# Patient Record
Sex: MALE | Race: WHITE
[De-identification: names, ages, dates, MRNs, and addresses within clinical notes are randomized per-mention and may not be internally consistent; named-entity substitution may affect disease eponyms.]

---

## 2017-05-15 ENCOUNTER — HOSPITAL ENCOUNTER (EMERGENCY)
Dept: HOSPITAL 74 - JERFT | Age: 32
Discharge: HOME | End: 2017-05-15
Payer: COMMERCIAL

## 2017-05-15 VITALS — SYSTOLIC BLOOD PRESSURE: 115 MMHG | DIASTOLIC BLOOD PRESSURE: 65 MMHG | HEART RATE: 79 BPM | TEMPERATURE: 97.9 F

## 2017-05-15 VITALS — BODY MASS INDEX: 25.8 KG/M2

## 2017-05-15 DIAGNOSIS — J30.9: Primary | ICD-10-CM

## 2017-05-15 NOTE — PDOC
History of Present Illness





- General


Chief Complaint: Cold Symptoms


Stated Complaint: COUGH/COUGHING BLOOD


Time Seen by Provider: 05/15/17 21:55


History Source: Patient, Parent(s)


Exam Limitations: No Limitations





- History of Present Illness


Initial Comments: 


05/15/17 22:45





came for evaluation of wheezing and cough=- thinks is related to allergies . 

Denies fever, denies phlegm or nasal drainage but states cough has become more 

moist and feels tighter. His takenZyrtec and nasal steroids per prescription, 

but without relief.


05/15/17 22:59





Timing/Duration: reports: changing over time, getting worse


Severity: reports: moderate


Modifying Factors: improves with: albuterol inhaler, coughing


Associated Symptoms: reports: cough, earache.  denies: chest pain/soreness, 

dizziness, fever/chills





Past History





- Travel


Traveled outside of the country in the last 30 days: Yes


Close contact w/someone who was outside of country & ill: Yes





- Past Medical History


Allergies/Adverse Reactions: 


 Allergies











Allergy/AdvReac Type Severity Reaction Status Date / Time


 


No Known Allergies Allergy   Verified 05/15/17 20:59











Home Medications: 


Ambulatory Orders





Albuterol Sulfate [Proventil HFA Inhaler -] 1 - 2 inh PO QID #1 inhaler 05/15/

17 


Prednisone [Deltasone -] 20 mg PO BID #8 tablet 05/15/17 








Other medical history: polycythemia vera





- Surgical History


Abdominal Surgery: Yes (INGUINAL AND UMBILICAL HERNIA REPAIR)





- Psycho/Social/Smoking Cessation Hx


Suicidal Ideation: No


Smoking History: Never smoked


Have you smoked in the past 12 months: No


Number of Cigarettes Smoked Daily: 0


Information on smoking cessation initiated: No


Hx Alcohol Use: Yes (WEEKENDS)


Drug/Substance Use Hx: No


Substance Use Type: Alcohol





**Review of Systems





- Review of Systems


Able to Perform ROS?: Yes


Is the patient limited English proficient: Yes


Constitutional: Yes: Symptoms Reported, See HPI, Malaise.  No: Fever


HEENTM: Yes: See HPI, Nose Congestion.  No: Symptoms Reported


Respiratory: Yes: Symptoms reported, See HPI, Cough, Wheezing


ABD/GI: Yes: Symptoms Reported, See HPI


Integumentary: Yes: Symptoms Reported


Neurological: Yes: Symptoms reported, See HPI.  No: Headache


All Other Systems: Reviewed and Negative





*Physical Exam





- Vital Signs


 Last Vital Signs











Temp Pulse Resp BP Pulse Ox


 


 97.9 F   79   18   115/65   97 


 


 05/15/17 20:59  05/15/17 20:59  05/15/17 20:59  05/15/17 20:59  05/15/17 20:59














- Physical Exam


General Appearance: Yes: Nourished, Appropriately Dressed, Apparent Distress, 

Mild Distress


HEENT: positive: CHRISTIAN, Normal ENT Inspection, TMs Normal, Pharynx Normal


Neck: positive: Supple.  negative: Tender, Lymphadenopathy (R), Lymphadenopathy 

(L)


Respiratory/Chest: positive: Lungs Clear (but tight ), Wheezing (tight 

inspiratory ).  negative: Normal Breath Sounds, Respiratory Distress


Cardiovascular: positive: Regular Rate


Gastrointestinal/Abdominal: positive: Normal Bowel Sounds, Soft.  negative: 

Tender


Extremity: positive: Normal Capillary Refill, Normal Inspection


Integumentary: positive: Normal Color, Dry, Pale


Neurologic: positive: CNs II-XII NML intact, Fully Oriented, Alert, Normal Mood/

Affect, Normal Response, Motor Strength 5/5





Progress Note





- Progress Note


Progress Note: 


ALLERGIC rhinitis, will treat with steroids, Proventil and discharge





*DC/Admit/Observation/Transfer


Diagnosis at time of Disposition: 


Allergic rhinitis


Qualifiers:


 Allergic rhinitis trigger: unspecified Allergic rhinitis seasonality: 

unspecified seasonality Qualified Code(s): J30.9 - Allergic rhinitis, 

unspecified





- Discharge Dispostion


Disposition: HOME


Condition at time of disposition: Stable


Admit: No





- Patient Instructions


Printed Discharge Instructions:  DI for Allergic Rhinitis


Additional Instructions: 


Rest, drink lots of fluids: Teas, water, soups


Saltwater gargles. Consider humidifier in room at night


Steamy showers/seem to face break up mucus


Avoid contact with allergens, exposure to pollens, close windows on a windy day


Lots of handwashing and good hygiene





Continue over-the-counter medications for symptomatic relief- may use allergic 

eyedrops for itching I


Continue antihistamines daily until pollen season is over; Zyrtec, Claritin, 

Allegra during the daytime and Benadryl at nighttime as will make sleepy


Tylenol or Motrin for fever and pain





Followup with private physician in one to 2 days as needed


Consider following up with an allergist/dermatologist for skin testing and 

possible allergy shots


Return to emergency department for worsened symptoms, fevers, dehydration





- Post Discharge Activity


Work/School Note:  Back to Work

## 2017-07-22 ENCOUNTER — HOSPITAL ENCOUNTER (EMERGENCY)
Dept: HOSPITAL 74 - JER | Age: 32
Discharge: HOME | End: 2017-07-22
Payer: COMMERCIAL

## 2017-07-22 VITALS — HEART RATE: 84 BPM | DIASTOLIC BLOOD PRESSURE: 86 MMHG | TEMPERATURE: 97.5 F | SYSTOLIC BLOOD PRESSURE: 131 MMHG

## 2017-07-22 VITALS — BODY MASS INDEX: 28.1 KG/M2

## 2017-07-22 DIAGNOSIS — R00.2: Primary | ICD-10-CM

## 2017-07-22 LAB
ALBUMIN SERPL-MCNC: 3.8 G/DL (ref 3.4–5)
ALP SERPL-CCNC: 66 U/L (ref 45–117)
ALT SERPL-CCNC: 32 U/L (ref 12–78)
ANION GAP SERPL CALC-SCNC: 6 MMOL/L (ref 8–16)
AST SERPL-CCNC: 18 U/L (ref 15–37)
BASOPHILS # BLD: 1 % (ref 0–2)
BILIRUB SERPL-MCNC: 0.3 MG/DL (ref 0.2–1)
CALCIUM SERPL-MCNC: 8.9 MG/DL (ref 8.5–10.1)
CO2 SERPL-SCNC: 30 MMOL/L (ref 21–32)
CREAT SERPL-MCNC: 0.9 MG/DL (ref 0.7–1.3)
DEPRECATED RDW RBC AUTO: 17.1 % (ref 11.9–15.9)
EOSINOPHIL # BLD: 3.6 % (ref 0–4.5)
GLUCOSE SERPL-MCNC: 87 MG/DL (ref 74–106)
MCH RBC QN AUTO: 25.3 PG (ref 25.7–33.7)
MCHC RBC AUTO-ENTMCNC: 32.3 G/DL (ref 32–35.9)
MCV RBC: 78.1 FL (ref 80–96)
NEUTROPHILS # BLD: 35.5 % (ref 42.8–82.8)
PLATELET # BLD AUTO: 227 K/MM3 (ref 134–434)
PMV BLD: 9.1 FL (ref 7.5–11.1)
PROT SERPL-MCNC: 7.3 G/DL (ref 6.4–8.2)
TROPONIN I SERPL-MCNC: < 0.02 NG/ML (ref 0–0.05)
URINE MARIJUANA THC: NEGATIVE NG/ML
WBC # BLD AUTO: 8 K/MM3 (ref 4–10)

## 2017-07-22 NOTE — PDOC
History of Present Illness





- General


Chief Complaint: Palpitations


Stated Complaint: PALPITATIONS


Time Seen by Provider: 07/22/17 03:02


History Source: Patient


Exam Limitations: No Limitations





- History of Present Illness


Initial Comments: 





07/22/17 04:05


32-year-old male with no medical history presents to the emergency department 

complaining of chest palpitations. Patient states he was resting yesterday, 

watching television when he felt an acute onset of chest palpitations lasting 

for approximately 2 minutes. This evening at approximately 2300 hrs., patient 

felt a second episode of chest palpitations lasting approximately 30 seconds 

while at rest. Patient denies headache, dizziness, lightheadedness, visual 

disturbance, neck pain, back pains, shortness of breath, abdominal pains, 

extremity numbness or tingling sensation. Patient denies any cardiac history. 

Patient states he's been a bit stress over the past 2 weeks 


Presenting Symptoms: Chest Pain


Timing/Duration: reports: intermittent


Severity/Quality: reports: dull





Past History





- Travel


Traveled outside of the country in the last 30 days: No


Close contact w/someone who was outside of country & ill: No





- Past Medical History


Allergies/Adverse Reactions: 


 Allergies











Allergy/AdvReac Type Severity Reaction Status Date / Time


 


No Known Allergies Allergy   Verified 07/22/17 02:20











Home Medications: 


Ambulatory Orders





Albuterol Sulfate [Proventil HFA Inhaler -] 1 - 2 inh PO QID #1 inhaler 05/16/ 17 


Prednisone [Deltasone -] 20 mg PO BID #8 tablet 05/16/17 











- Surgical History


Abdominal Surgery: Yes (INGUINAL AND UMBILICAL HERNIA REPAIR)





- Psycho/Social/Smoking Cessation Hx


Suicidal Ideation: No


Smoking History: Never smoked


Have you smoked in the past 12 months: No


Number of Cigarettes Smoked Daily: 0


Information on smoking cessation initiated: No


Hx Alcohol Use: No


Drug/Substance Use Hx: No


Substance Use Type: Alcohol





**Review of Systems





- Review of Systems


Able to Perform ROS?: Yes


Comments:: 


07/22/17 04:10


CONSTITUTIONAL: 


Absent: fever, chills, diaphoresis, generalized weakness, malaise, loss of 

appetite


HEENT: 


Absent: rhinorrhea, nasal congestion, throat pain, throat swelling, difficulty 

swallowing, mouth swelling, ear pain, eye pain, visual Changes


CARDIOVASCULAR: 


+chest palpitations


Absent: chest pain, loss of consciousness, palpitations, irregular heart rate, 

peripheral edema


RESPIRATORY: 


Absent: cough, shortness of breath, dyspnea with exertion, orthopnea, wheezing, 

stridor, hemoptysis


GASTROINTESTINAL:


Absent: abdominal pain, abdominal distension, nausea, vomiting, diarrhea, 

constipation, melena, hematochezia


GENITOURINARY: 


Absent: dysuria, frequency, urgency, hesitancy, hematuria, flank pain, genital 

pain


MUSCULOSKELETAL: 


Absent: myalgia, arthralgia, joint swelling


SKIN: 


Absent: rash, itching, pallor


HEMATOLOGIC/IMMUNOLOGIC: 


Absent: easy bleeding, easy bruising, lymphadenopathy, frequent infections


ENDOCRINE:


Absent: unexplained weight gain, unexplained weight loss, heat intolerance, 

cold intolerance


NEUROLOGIC: 


Absent: headache, focal weakness or paresthesias, dizziness, unsteady gait, 

seizure, mental status changes, bladder or bowel incontinence


PSYCHIATRIC: 


Absent: anxiety, depression, suicidal or homicidal ideation, hallucinations.











Is the patient limited English proficient: No





*Physical Exam





- Vital Signs


 Last Vital Signs











Temp Pulse Resp BP Pulse Ox


 


 97.5 F L  84   20   131/86   98 


 


 07/22/17 02:20  07/22/17 02:20  07/22/17 02:20  07/22/17 02:20  07/22/17 02:20














- Physical Exam


Comments: 





07/22/17 04:10


GENERAL:


Well developed, well nourished. Awake and alert. No acute distress.


HEENT:


Normocephalic, atraumatic. PERRLA, EOMI. No conjunctival pallor. Sclera are non-

icteric. Moist mucous membranes. Oropharynx is clear.


NECK: 


Supple. Full ROM. No JVD. Carotid pulses 2+ and symmetric, without bruits. No 

thyromegaly. No lymphadenopathy.


CARDIOVASCULAR:


Regular rate and rhythm. No murmurs, rubs, or gallops. Distal pulses are 2+ and 

symmetric. 


PULMONARY: 


No evidence of respiratory distress. Lungs clear to auscultation bilaterally. 

No wheezing, rales or rhonchi.


ABDOMINAL:


Soft. Non-tender. Non-distended. No rebound or guarding. No organomegaly. 

Normoactive bowel sounds. 


MUSCULOSKELETAL 


Normal range of motion at all joints. No bony deformities or tenderness. No CVA 

tenderness.


EXTREMITIES: 


No cyanosis. No clubbing. No edema. No calf tenderness.


SKIN: 


Warm and dry. Normal capillary refill. No rashes. No jaundice. 


NEUROLOGICAL: 


Alert, awake, appropriate. Cranial nerves 2-12 intact. No deficits to light 

touch and temperature in face, upper extremities and lower extremities. No 

motor deficits in the in face, upper extremities and lower extremities. 

Normoreflexic in the upper and lower extremities. Normal speech. Toes are down-

going bilaterally. Gait is normal without ataxia.


PSYCHIATRIC: 


Cooperative. Good eye contact. Appropriate mood and affect.





**Heart Score/ECG Review





- History


History: Slightly suspicious





- Electrocardiogram


EKG: Normal





- Age


Age: >/= 65





- Risk Factors


Based on the list above the patient has:: No risk factors known





- Troponin


Troponin: </= normal limit





- Score


Heart Score - Total: 2





ED Treatment Course





- LABORATORY


CBC & Chemistry Diagram: 


 07/22/17 03:08





 07/22/17 03:08





- ADDITIONAL ORDERS


Additional order review: 


 Laboratory  Results











  07/22/17 07/22/17





  03:08 03:08


 


Sodium   141


 


Potassium   4.3


 


Chloride   105


 


Carbon Dioxide   30


 


Anion Gap   6 L


 


BUN   13


 


Creatinine   0.9


 


Creat Clearance w eGFR   > 60


 


Random Glucose   87


 


Calcium   8.9


 


Total Bilirubin   0.3  D


 


AST   18


 


ALT   32  D


 


Alkaline Phosphatase   66


 


Creatine Kinase   126


 


Troponin I   < 0.02


 


Total Protein   7.3


 


Albumin   3.8


 


Opiates Screen  Negative 


 


Methadone Screen  Negative 


 


Barbiturate Screen  Negative 


 


Phencyclidine Screen  Negative 


 


Ur Amphetamines Screen  Negative 


 


MDMA (Ecstasy) Screen  Negative 


 


Benzodiazepines Screen  Negative 


 


Cocaine Screen  Negative 


 


U Marijuana (THC) Screen  Negative 








 











  07/22/17





  03:08


 


RBC  6.21 H


 


MCV  78.1 L


 


MCHC  32.3


 


RDW  17.1 H


 


MPV  9.1


 


Neutrophils %  35.5 L


 


Lymphocytes %  52.4 H


 


Monocytes %  7.5


 


Eosinophils %  3.6


 


Basophils %  1.0














- RADIOLOGY


Radiograph Interpretation: 





07/22/17 04:53


CXR 2v NAD





*DC/Admit/Observation/Transfer


Diagnosis at time of Disposition: 


 Heart palpitations





- Discharge Dispostion


Condition at time of disposition: Stable


Admit: No





- Referrals


Referrals: 


Hardeep Dave MD [Staff Physician] - 





- Patient Instructions


Printed Discharge Instructions:  DI for Palpitations


Additional Instructions: 


Follow up with the cardiologist this week.


You will need an echocardiogram





Return to the ER for severe/persistent/worsening symptoms

## 2017-07-24 NOTE — EKG
Test Reason : 

Blood Pressure : ***/*** mmHG

Vent. Rate : 076 BPM     Atrial Rate : 076 BPM

   P-R Int : 156 ms          QRS Dur : 082 ms

    QT Int : 354 ms       P-R-T Axes : 055 060 024 degrees

   QTc Int : 398 ms

 

NORMAL SINUS RHYTHM

NORMAL ECG

WHEN COMPARED WITH ECG OF 19-DEC-2016 16:59,

NO SIGNIFICANT CHANGE WAS FOUND

Confirmed by PATRICK HANKS MD ([id number]) on 7/24/2017 9:28:53 AM

 

Referred By:             Confirmed By:PATRICK HANKS MD

## 2017-09-27 ENCOUNTER — HOSPITAL ENCOUNTER (EMERGENCY)
Dept: HOSPITAL 74 - JER | Age: 32
LOS: 1 days | Discharge: HOME | End: 2017-09-28
Payer: COMMERCIAL

## 2017-09-27 VITALS — SYSTOLIC BLOOD PRESSURE: 136 MMHG | TEMPERATURE: 98.4 F | HEART RATE: 93 BPM | DIASTOLIC BLOOD PRESSURE: 84 MMHG

## 2017-09-27 VITALS — BODY MASS INDEX: 26.5 KG/M2

## 2017-09-27 DIAGNOSIS — R00.2: Primary | ICD-10-CM

## 2017-09-28 NOTE — PDOC
History of Present Illness





- General


History Source: Patient





<JaviermagdyAbelardo - Last Filed: 09/28/17 00:42>





- General


History Source: Patient


Exam Limitations: No Limitations





<Alma Gill - Last Filed: 09/28/17 00:51>





- General


Chief Complaint: Palpitations


Stated Complaint: PALPITATIONS


Time Seen by Provider: 09/28/17 00:39





- History of Present Illness


Initial Comments: 


09/28/17 00:50


31 yo M with no pmhx who presents to the ED with complaint of palpitations. 

Patient was seen here in July for the same complaint, had lab work done, EKG 

was good and he was referred to Dr. Dave of cardiology for follow up. He 

notes that his palpitations continue. 


He denies any cp, SOB, nausea, vomiting, diarrhea, fever, chills. He denies any 

urinary complaints. 


He denies any recent travel outside of the country


SH - no alcohol use, cigarette use or IVDU. (Alma Gill)








Past History





- Past Medical History


Anemia: No


Asthma: No


Cancer: No


Cardiac Disorders: No


CVA: No


COPD: No


DVT: No


Dementia: No


Diabetes: No


Dialysis: No


GI Disorders: No


 Disorders: No


HTN: No


Hypercholesterolemia: No


Kidney Stones: No


Liver Disease: No


Psychiatric Problems: No


Seizures: No


Thyroid Disease: Yes (Sleep Problems)


Lung CA: No





- Surgical History


Abdominal Surgery: Yes (INGUINAL AND UMBILICAL HERNIA REPAIR)





- Suicide/Smoking/Psychosocial Hx


Smoking History: Never smoked


Have you smoked in the past 12 months: No


Number of Cigarettes Smoked Daily: 0


Hx Alcohol Use: No


Drug/Substance Use Hx: No


Substance Use Type: Alcohol





<Abelardo Benz - Last Filed: 09/28/17 00:42>





<Alma Gill - Last Filed: 09/28/17 00:51>





- Past Medical History


Allergies/Adverse Reactions: 


 Allergies











Allergy/AdvReac Type Severity Reaction Status Date / Time


 


No Known Allergies Allergy   Verified 09/27/17 23:35











Home Medications: 


Ambulatory Orders





Diphenhydramine HCl [Benadryl -] 25 mg PO HS 07/22/17 











**Review of Systems





<Abelardo Benz - Last Filed: 09/28/17 00:42>





- Review of Systems


Able to Perform ROS?: Yes





<Alma Gill - Last Filed: 09/28/17 00:51>





- Review of Systems


Comments:: 


09/28/17 00:50


CONSTITUTIONAL: 


Absent: fever, chills, diaphoresis, generalized weakness, malaise, loss of 

appetite


HEENT: 


Absent: rhinorrhea, nasal congestion, throat pain, throat swelling, difficulty 

swallowing, mouth swelling, ear pain, eye pain, visual Changes


CARDIOVASCULAR: 


Present: palpitations


Absent: chest pain, syncope, irregular heart rate, lightheadedness, peripheral 

edema


RESPIRATORY: 


Absent: cough, shortness of breath, dyspnea with exertion, orthopnea, wheezing, 

stridor, hemoptysis


GASTROINTESTINAL:


Absent: abdominal pain, abdominal distension, nausea, vomiting, diarrhea, 

constipation, melena, hematochezia


GENITOURINARY: 


Absent: dysuria, frequency, urgency, hesitancy, hematuria, flank pain, genital 

pain


MUSCULOSKELETAL: 


Absent: myalgia, arthralgia, joint swelling


SKIN: 


Absent: rash, itching, pallor


HEMATOLOGIC/IMMUNOLOGIC: 


Absent: easy bleeding, easy bruising, lymphadenopathy, frequent infections


ENDOCRINE:


Absent: unexplained weight gain, unexplained weight loss, heat intolerance, 

cold intolerance


NEUROLOGIC: 


Absent: headache, focal weakness or paresthesias, dizziness, unsteady gait, 

seizure, mental status changes, bladder or bowel incontinence


PSYCHIATRIC: 


Absent: anxiety, depression, suicidal or homicidal ideation, hallucinations.


 (Alma Gill)








*Physical Exam





<Abelardo Benz - Last Filed: 09/28/17 00:42>





<Alma Gill - Last Filed: 09/28/17 00:51>





- Vital Signs





 Last Vital Signs











Temp Pulse Resp BP Pulse Ox


 


 98.4 F   93 H  18   136/84   99 


 


 09/27/17 23:33  09/27/17 23:33  09/27/17 23:33  09/27/17 23:33  09/27/17 23:33

















- Physical Exam


Comments: 


09/28/17 00:50


GENERAL:


Well developed, well nourished. Awake and alert. In no acute distress.


HEENT:


Normocephalic, atraumatic. PERRLA, EOMI. No conjunctival pallor. Sclerae are non

-icteric. Moist mucous membranes. Oropharynx is clear.


NECK: Supple. Full ROM. No JVD. Carotid pulses 2+ and symmetric, without 

bruits. No thyromegaly. No lymphadenopathy.


CARDIOVASCULAR:


Regular rate and rhythm. No murmurs, rubs, or gallops. Distal pulses are 2+ and 

symmetric. 


PULMONARY: 


No evidence of respiratory distress. Lungs clear to auscultation bilaterally. 

No wheezing, rales or rhonchi.


ABDOMINAL:


Soft. Non-tender. Non-distended. No rebound or guarding. No organomegaly. 

Normoactive bowel sounds. 


MUSCULOSKELETAL 


Normal range of motion at all joints. No bony deformities or tenderness. No CVA 

tenderness.


EXTREMITIES: 


No cyanosis. No clubbing. No edema. No calf tenderness.


SKIN: Warm and dry. Normal capillary refill. No rashes. No jaundice. 


NEUROLOGICAL: Alert, awake, appropriate. Cranial nerves 2-12 intact. No 

deficits to light touch and temperature in face, upper extremities and lower 

extremities. No motor deficits in the in face, upper extremities and lower 

extremities. Normoreflexic in the upper and lower extremities. Normal speech. 

Toes are downgoing bilaterally. 


PSYCHIATRIC: 


Cooperative. Good eye contact. Appropriate mood and affect.


 (Alma Gill)








**Heart Score/ECG Review





<Alma Gill - Last Filed: 09/28/17 00:51>


  ** #1





09/28/17 00:51


NS at 89 bpm 


Normal ECG (Alma Gill)








*DC/Admit/Observation/Transfer





- Discharge Dispostion


Admit: No





<Abelardo Benz - Last Filed: 09/28/17 00:42>





<Alma Gill - Last Filed: 09/28/17 00:51>


Diagnosis at time of Disposition: 


 Heart palpitations





- Referrals


Referrals: 


Hardeep Dave MD [Staff Physician] - 





- Patient Instructions


Printed Discharge Instructions:  DI for Palpitations


Additional Instructions: 


Please make appointment earlier to follow up with cardiology.  





- Attestations


Scribe Attestion: 





09/28/17 00:51





Documentation prepared by BRICE Cuevas, acting as medical scribe for 

Abelardo Benz DO. (Alma Gill)

## 2017-10-05 NOTE — EKG
Test Reason : 

Blood Pressure : ***/*** mmHG

Vent. Rate : 089 BPM     Atrial Rate : 089 BPM

   P-R Int : 154 ms          QRS Dur : 086 ms

    QT Int : 352 ms       P-R-T Axes : 051 061 028 degrees

   QTc Int : 428 ms

 

NORMAL SINUS RHYTHM

NORMAL ECG

WHEN COMPARED WITH ECG OF 22-JUL-2017 02:03,

NO SIGNIFICANT CHANGE WAS FOUND

Confirmed by PATRICK HANKS MD ([id number]) on 10/5/2017 2:17:55 PM

 

Referred By:             Confirmed By:PATRICK HANKS MD

## 2017-10-08 ENCOUNTER — HOSPITAL ENCOUNTER (EMERGENCY)
Dept: HOSPITAL 74 - JERFT | Age: 32
Discharge: HOME | End: 2017-10-08
Payer: COMMERCIAL

## 2017-10-08 VITALS — TEMPERATURE: 98.6 F | HEART RATE: 87 BPM | DIASTOLIC BLOOD PRESSURE: 83 MMHG | SYSTOLIC BLOOD PRESSURE: 131 MMHG

## 2017-10-08 VITALS — BODY MASS INDEX: 25.8 KG/M2

## 2017-10-08 DIAGNOSIS — R09.81: Primary | ICD-10-CM
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
## 2018-05-19 ENCOUNTER — HOSPITAL ENCOUNTER (EMERGENCY)
Dept: HOSPITAL 74 - JER | Age: 33
Discharge: HOME | End: 2018-05-19
Payer: COMMERCIAL

## 2018-05-19 VITALS — BODY MASS INDEX: 27.1 KG/M2

## 2018-05-19 VITALS — SYSTOLIC BLOOD PRESSURE: 127 MMHG | TEMPERATURE: 98.2 F | DIASTOLIC BLOOD PRESSURE: 87 MMHG | HEART RATE: 91 BPM

## 2018-05-19 DIAGNOSIS — J18.9: Primary | ICD-10-CM

## 2018-05-19 NOTE — PDOC
History of Present Illness





- General


History Source: Patient


Exam Limitations: No Limitations





- History of Present Illness


Initial Comments: 





05/19/18 06:35


Patient is a 33 year old male with no significant past medical history who 

presents to the ED with complaints of coughing that he states began x3 weeks 

ago.  Patient reports experiencing cough sudden cough that he states began 3 

weeks ago, and has shown no signs of subsiding.  He reports experiencing 

associated right flank pain that he states is increased secondary to cough. 

Patient reports going to urgent care earlier this week for cough, stating he 

was prescribed cough medicine and discharged. 





Denies chest pain, sob. Denies nausea vomiting. Denies fevers, chills. Denies 

contact with sick individuals, out of state travelling. Denies any other 

symptoms. 





Allergies: None


Social history: Lived with girlfriend. No smoking. No alcohol. No illicit 

drugs. 


Surgical history: None


PMD: None








<Vijay Ramos - Last Filed: 05/19/18 06:35>





<Lupe Beltre - Last Filed: 05/20/18 05:25>





- General


Stated Complaint: COUGH, BACK PAIN


Time Seen by Provider: 05/19/18 03:00





Past History





<Vijay Ramos - Last Filed: 05/19/18 06:35>





- Past Medical History


Anemia: No


Asthma: No


Cancer: No


Cardiac Disorders: No


CVA: No


COPD: No


DVT: No


Dementia: No


Diabetes: No


Dialysis: No


GI Disorders: No


 Disorders: No


HTN: No


Hypercholesterolemia: No


Kidney Stones: No


Liver Disease: No


Psychiatric Problems: No


Seizures: No


Thyroid Disease: Yes (Sleep Problems)


Lung CA: No





- Surgical History


Abdominal Surgery: Yes (INGUINAL AND UMBILICAL HERNIA REPAIR)





- Suicide/Smoking/Psychosocial Hx


Smoking History: Never smoked


Have you smoked in the past 12 months: No


Number of Cigarettes Smoked Daily: 0


Hx Alcohol Use: No


Drug/Substance Use Hx: No


Substance Use Type: Alcohol





<Lupe Beltre - Last Filed: 05/20/18 05:25>





- Past Medical History


Allergies/Adverse Reactions: 


 Allergies











Allergy/AdvReac Type Severity Reaction Status Date / Time


 


No Known Allergies Allergy   Verified 10/08/17 21:06











Home Medications: 


Ambulatory Orders





Ibuprofen 600 mg PO TID #21 tablet 10/08/17 


Pseudoephedrine HCl [Sudafed 12 Hour] 120 mg PO BID #14 tablet.er 10/08/17 


Azithromycin [Zithromax -] 250 mg PO DAILY #4 tab 05/19/18 











**Review of Systems





- Review of Systems


Able to Perform ROS?: Yes


Comments:: 





05/19/18 06:35


GENERAL/CONSTITUTIONAL: No fever or chills. No weakness.


HEAD, EYES, EARS, NOSE AND THROAT: No change in vision. No ear pain or 

discharge. No sore throat.


CARDIOVASCULAR: No chest pain or shortness of breath.


RESPIRATORY: +Cough. 


No  wheezing, or hemoptysis.


GASTROINTESTINAL: No nausea, vomiting, diarrhea or constipation.


GENITOURINARY: No dysuria, frequency, or change in urination.


MUSCULOSKELETAL: +Right flank pain. 


No joint or muscle swelling or pain. No neck pain.


SKIN: No rash


NEUROLOGIC: No headache, vertigo, loss of consciousness, or change in strength/

sensation.


ENDOCRINE: No increased thirst. No abnormal weight change.


HEMATOLOGIC/LYMPHATIC: No anemia, easy bleeding, or history of blood clots.


ALLERGIC/IMMUNOLOGIC: No hives or skin allergy.








<Vijay Ramos - Last Filed: 05/19/18 06:35>





*Physical Exam





- Vital Signs


 Last Vital Signs











Temp Pulse Resp BP Pulse Ox


 


 98.2 F   91 H  18   127/87   95 


 


 05/19/18 03:19  05/19/18 03:19  05/19/18 03:19  05/19/18 03:19  05/19/18 03:19














- Physical Exam


Comments: 





05/19/18 06:35


GENERAL: Awake, alert, and fully oriented, in no acute distress


HEAD: No signs of trauma


EYES: PERRLA, EOMI, sclera anicteric, conjunctiva clear


ENT: Auricles normal inspection, hearing grossly normal, nares patent, 

oropharynx clear without exudates. Moist mucosa


NECK: Normal ROM, supple, no lymphadenopathy, JVD, or masses


LUNGS: Breath sounds equal, clear to auscultation bilaterally.  No wheezes, and 

no crackles


HEART: Regular rate and rhythm, normal S1 and S2, no murmurs, rubs or gallops


ABDOMEN: Soft, nontender, normoactive bowel sounds.  No guarding, no rebound.  

No masses


EXTREMITIES: Normal range of motion, no edema.  No clubbing or cyanosis. No 

cords, erythema, or tenderness


NEUROLOGICAL: Cranial nerves II through XII grossly intact.  Normal speech, 

normal gait


SKIN: Warm, Dry, normal turgor, no rashes or lesions noted.








<Vijay Ramos - Last Filed: 05/19/18 06:35>





ED Treatment Course





- Medications


Given in the ED: 


ED Medications














Discontinued Medications














Generic Name Dose Route Start Last Admin





  Trade Name Lien  PRN Reason Stop Dose Admin


 


Azithromycin  500 mg  05/19/18 03:47  05/19/18 04:31





  Zithromax -  PO  05/19/18 03:48  500 mg





  ONCE ONE   Administration














<Vijay Ramos - Last Filed: 05/19/18 06:35>





Medical Decision Making





- Medical Decision Making





05/20/18 05:24


Pt comes with 3 weeks of cough; likely atypical pneumonia; I will get a CXR to r

/o bad pathology; if normal CXR I will treat for atypical bacteria with a zpak.


Exam as above.





<Lupe Beltre - Last Filed: 05/20/18 05:25>





*DC/Admit/Observation/Transfer





- Attestations


Scribe Attestion: 





05/19/18 06:35





Documentation prepared by Vijay Ramos, acting as medical scribe for Lupe Beltre MD.





<Vijay Ramos - Last Filed: 05/19/18 06:35>





- Discharge Dispostion


Decision to Admit order: No





<Lupe Beltre - Last Filed: 05/20/18 05:25>


Diagnosis at time of Disposition: 


 Atypical pneumonia








- Discharge Dispostion


Disposition: HOME


Condition at time of disposition: Stable





- Prescriptions


Prescriptions: 


Azithromycin [Zithromax -] 250 mg PO DAILY #4 tab





- Patient Instructions


Printed Discharge Instructions:  DI for Atypical Pneumonia

## 2018-07-06 ENCOUNTER — HOSPITAL ENCOUNTER (EMERGENCY)
Dept: HOSPITAL 74 - JERFT | Age: 33
Discharge: HOME | End: 2018-07-06
Payer: COMMERCIAL

## 2018-07-06 VITALS — BODY MASS INDEX: 28.1 KG/M2

## 2018-07-06 VITALS — DIASTOLIC BLOOD PRESSURE: 67 MMHG | SYSTOLIC BLOOD PRESSURE: 118 MMHG | HEART RATE: 75 BPM | TEMPERATURE: 98 F

## 2018-07-06 DIAGNOSIS — K21.9: ICD-10-CM

## 2018-07-06 DIAGNOSIS — H57.12: Primary | ICD-10-CM

## 2018-07-06 NOTE — PDOC
History of Present Illness





- History of Present Illness


Initial Comments: 





 





<Luisa Pappas - Last Filed: 07/06/18 22:51>





- General


History Source: Patient


Exam Limitations: No Limitations





- History of Present Illness


Initial Comments: 





The patient is a 33 year old male with a significant PMH of acid reflux 

presenting with left ear and eye pain for two days. The patient states the his 

eye pain is worse with eye movement. Patient denies similar symptoms in the 

past. The patient admits to photophobia but denies any eye or ear discharge. 

The patient reports he had an accident when he was young in which he had an 

infection in his right eye and follows with an opthamologist to keep his left 

eye healthy. Patient has a scheduled appointment next week. The patient states 

he had high pressure in his left eye one year ago and was prescribed eye drops 

at the time. The patient denies any vision changes. The patient states he took 

Tylenol with no relief. 





The patient denies chest pain, shortness of breath, headache and dizziness.


Denies fever, chills, nausea, vomit, diarrhea and constipation.





Allergies: NKA


Past surgical history:  hernia surgery 


Social history: No reported alcohol, drug, or cigarette use. 


PCP: Dr. Mckenna 








<Sierra Willard - Last Filed: 07/07/18 00:37>





- General


Chief Complaint: Eye Problem


Stated Complaint: EYE PAIN


Time Seen by Provider: 07/06/18 22:48





Past History





<Luisa Pappas - Last Filed: 07/06/18 22:51>





- Past Medical History


Anemia: No


Asthma: No


Cancer: No


Cardiac Disorders: No


CVA: No


COPD: No


DVT: No


Dementia: No


Diabetes: No


Dialysis: No


GI Disorders: No


 Disorders: No


HTN: No


Hypercholesterolemia: No


Kidney Stones: No


Liver Disease: No


Psychiatric Problems: No


Seizures: No


Thyroid Disease: Yes (Sleep Problems)


Lung CA: No





- Surgical History


Abdominal Surgery: Yes (INGUINAL AND UMBILICAL HERNIA REPAIR)





- Suicide/Smoking/Psychosocial Hx


Smoking History: Never smoked


Have you smoked in the past 12 months: No


Number of Cigarettes Smoked Daily: 0


Information on smoking cessation initiated: No


Hx Alcohol Use: No


Drug/Substance Use Hx: No


Substance Use Type: Alcohol





<Sierra Willard - Last Filed: 07/07/18 00:37>





- Past Medical History


Allergies/Adverse Reactions: 


 Allergies











Allergy/AdvReac Type Severity Reaction Status Date / Time


 


No Known Allergies Allergy   Verified 07/06/18 23:00











Home Medications: 


Ambulatory Orders





Ibuprofen 600 mg PO TID #21 tablet 10/08/17 


Pseudoephedrine HCl [Sudafed 12 Hour] 120 mg PO BID #14 tablet.er 10/08/17 











**Review of Systems





- Review of Systems


Able to Perform ROS?: Yes


Comments:: 





GENERAL/CONSTITUTIONAL: No fever or chills. No weakness.


HEAD, EYES, EARS, NOSE AND THROAT: (+) Left eye pain. (+) Left ear pain. No 

change in vision. No ear discharge. No sore throat.


CARDIOVASCULAR: No chest pain or shortness of breath.


RESPIRATORY: No cough, wheezing, or hemoptysis.


GASTROINTESTINAL: No nausea, vomiting, diarrhea or constipation.


GENITOURINARY: No dysuria, frequency, or change in urination.


MUSCULOSKELETAL: No joint or muscle swelling or pain. No neck or back pain.


SKIN: No rash


NEUROLOGIC: No headache, vertigo, loss of consciousness, or change in strength/

sensation.


ENDOCRINE: No increased thirst. No abnormal weight change.


HEMATOLOGIC/LYMPHATIC: No anemia, easy bleeding, or history of blood clots.


ALLERGIC/IMMUNOLOGIC: No hives or skin allergy.








<Sierra Willard - Last Filed: 07/07/18 00:37>





*Physical Exam





- Vital Signs


 Last Vital Signs











Temp Pulse Resp BP Pulse Ox


 


 98.0 F   75   18   118/67   100 


 


 07/06/18 20:30  07/06/18 20:30  07/06/18 20:30  07/06/18 20:30  07/06/18 20:30














<Luisa Pappas - Last Filed: 07/06/18 22:51>





- Vital Signs


 Last Vital Signs











Temp Pulse Resp BP Pulse Ox


 


 98.0 F   75   18   118/67   100 


 


 07/06/18 20:30  07/06/18 20:30  07/06/18 20:30  07/06/18 20:30  07/06/18 20:30














- Physical Exam


Comments: 





GENERAL: Awake, alert, and fully oriented, in no acute distress


HEAD: No signs of trauma


EYES: PERRLA, EOMI, sclera anicteric, conjunctiva clear. Fluorescein stain of L 

eye- no abnormal uptake. 


ENT: Auricles normal inspection, hearing grossly normal, nares patent, 

oropharynx clear without exudates. Moist mucosa


NECK: Normal ROM, supple, no lymphadenopathy, JVD, or masses


EXTREMITIES: Normal range of motion, no edema.  No clubbing or cyanosis. No 

cords, erythema, or tenderness


NEUROLOGICAL: Cranial nerves II through XII grossly intact.  Normal speech, 

normal gait. Motor and sensation intact.


SKIN: Warm, Dry, normal turgor, no rashes or lesions noted.








<Sierra Willard - Last Filed: 07/07/18 00:37>





Medical Decision Making





- Medical Decision Making





No swelling around the eye, no redness, no signs of infection. No tenderness to 

palpation of the globe over the eyelid. Globe is not firm, not indurated. 

Fluorescein exam negative. Advised him to call his ophthalmologist in AM for 

follow-up. 








<Sierra Willard - Last Filed: 07/07/18 00:37>





*DC/Admit/Observation/Transfer





<Luisa Pappas - Last Filed: 07/06/18 22:51>





- Discharge Dispostion


Decision to Admit order: No





<Sierra Willard - Last Filed: 07/07/18 00:37>


Diagnosis at time of Disposition: 


 Left eye pain








- Discharge Dispostion


Disposition: HOME


Condition at time of disposition: Stable





- Patient Instructions


Printed Discharge Instructions:  DI for Eye Pain

## 2018-10-16 ENCOUNTER — HOSPITAL ENCOUNTER (EMERGENCY)
Dept: HOSPITAL 26 - MED | Age: 33
Discharge: HOME | End: 2018-10-16
Payer: MEDICAID

## 2018-10-16 VITALS — BODY MASS INDEX: 29.03 KG/M2 | WEIGHT: 196 LBS | HEIGHT: 69 IN

## 2018-10-16 VITALS — DIASTOLIC BLOOD PRESSURE: 79 MMHG | SYSTOLIC BLOOD PRESSURE: 121 MMHG

## 2018-10-16 VITALS — SYSTOLIC BLOOD PRESSURE: 121 MMHG | DIASTOLIC BLOOD PRESSURE: 79 MMHG

## 2018-10-16 DIAGNOSIS — J32.9: Primary | ICD-10-CM

## 2018-10-16 DIAGNOSIS — R42: ICD-10-CM

## 2018-10-16 LAB
ALBUMIN FLD-MCNC: 4.1 G/DL (ref 3.4–5)
ANION GAP SERPL CALCULATED.3IONS-SCNC: 9.7 MMOL/L (ref 8–16)
APPEARANCE UR: CLEAR
AST SERPL-CCNC: 18 U/L (ref 15–37)
BARBITURATES UR QL SCN: NEGATIVE NG/ML
BASOPHILS # BLD AUTO: 0.1 K/UL (ref 0–0.22)
BASOPHILS NFR BLD AUTO: 0.7 % (ref 0–2)
BENZODIAZ UR QL SCN: NEGATIVE NG/ML
BILIRUB SERPL-MCNC: 0.3 MG/DL (ref 0–1)
BILIRUB UR QL STRIP: NEGATIVE
BUN SERPL-MCNC: 16 MG/DL (ref 7–18)
BZE UR QL SCN: NEGATIVE NG/ML
CANNABINOIDS UR QL SCN: NEGATIVE NG/ML
CHLORIDE SERPL-SCNC: 103 MMOL/L (ref 98–107)
CO2 SERPL-SCNC: 29.3 MMOL/L (ref 21–32)
COLOR UR: YELLOW
CREAT SERPL-MCNC: 1 MG/DL (ref 0.7–1.3)
EOSINOPHIL # BLD AUTO: 0.2 K/UL (ref 0–0.4)
EOSINOPHIL NFR BLD AUTO: 2.5 % (ref 0–4)
ERYTHROCYTE [DISTWIDTH] IN BLOOD BY AUTOMATED COUNT: 18.3 % (ref 11.6–13.7)
GFR SERPL CREATININE-BSD FRML MDRD: 112 ML/MIN (ref 90–?)
GLUCOSE SERPL-MCNC: 97 MG/DL (ref 74–106)
GLUCOSE UR STRIP-MCNC: NEGATIVE MG/DL
HCT VFR BLD AUTO: 51.9 % (ref 36–52)
HGB BLD-MCNC: 17.1 G/DL (ref 12–18)
HGB UR QL STRIP: NEGATIVE
LEUKOCYTE ESTERASE UR QL STRIP: NEGATIVE
LYMPHOCYTES # BLD AUTO: 3.3 K/UL (ref 2–11.5)
LYMPHOCYTES NFR BLD AUTO: 45.8 % (ref 20.5–51.1)
MCH RBC QN AUTO: 25 PG (ref 27–31)
MCHC RBC AUTO-ENTMCNC: 33 G/DL (ref 33–37)
MCV RBC AUTO: 76.3 FL (ref 80–94)
MONOCYTES # BLD AUTO: 0.8 K/UL (ref 0.8–1)
MONOCYTES NFR BLD AUTO: 11.3 % (ref 1.7–9.3)
NEUTROPHILS # BLD AUTO: 2.9 K/UL (ref 1.8–7.7)
NEUTROPHILS NFR BLD AUTO: 39.7 % (ref 42.2–75.2)
NITRITE UR QL STRIP: NEGATIVE
OPIATES UR QL SCN: NEGATIVE NG/ML
PCP UR QL SCN: NEGATIVE NG/ML
PH UR STRIP: 6 [PH] (ref 5–9)
PLATELET # BLD AUTO: 226 K/UL (ref 140–450)
POTASSIUM SERPL-SCNC: 4 MMOL/L (ref 3.5–5.1)
RBC # BLD AUTO: 6.8 MIL/UL (ref 4.2–6.1)
SODIUM SERPL-SCNC: 138 MMOL/L (ref 136–145)
WBC # BLD AUTO: 7.2 K/UL (ref 4.8–10.8)

## 2018-10-27 ENCOUNTER — HOSPITAL ENCOUNTER (EMERGENCY)
Dept: HOSPITAL 26 - MED | Age: 33
Discharge: HOME | End: 2018-10-27
Payer: MEDICAID

## 2018-10-27 VITALS — SYSTOLIC BLOOD PRESSURE: 137 MMHG | DIASTOLIC BLOOD PRESSURE: 83 MMHG

## 2018-10-27 VITALS — SYSTOLIC BLOOD PRESSURE: 135 MMHG | DIASTOLIC BLOOD PRESSURE: 91 MMHG

## 2018-10-27 VITALS — WEIGHT: 202 LBS | HEIGHT: 70 IN | BODY MASS INDEX: 28.92 KG/M2

## 2018-10-27 DIAGNOSIS — D45: ICD-10-CM

## 2018-10-27 DIAGNOSIS — K29.60: Primary | ICD-10-CM

## 2018-10-27 LAB
ALBUMIN FLD-MCNC: 3.9 G/DL (ref 3.4–5)
ANION GAP SERPL CALCULATED.3IONS-SCNC: 11.9 MMOL/L (ref 8–16)
APPEARANCE UR: CLEAR
AST SERPL-CCNC: 20 U/L (ref 15–37)
BASOPHILS # BLD AUTO: 0 K/UL (ref 0–0.22)
BASOPHILS NFR BLD AUTO: 0.2 % (ref 0–2)
BILIRUB SERPL-MCNC: 0.4 MG/DL (ref 0–1)
BILIRUB UR QL STRIP: NEGATIVE
BUN SERPL-MCNC: 10 MG/DL (ref 7–18)
CHLORIDE SERPL-SCNC: 103 MMOL/L (ref 98–107)
CO2 SERPL-SCNC: 28.4 MMOL/L (ref 21–32)
COLOR UR: YELLOW
CREAT SERPL-MCNC: 1.1 MG/DL (ref 0.7–1.3)
EOSINOPHIL # BLD AUTO: 0 K/UL (ref 0–0.4)
EOSINOPHIL NFR BLD AUTO: 0.1 % (ref 0–4)
ERYTHROCYTE [DISTWIDTH] IN BLOOD BY AUTOMATED COUNT: 18.2 % (ref 11.6–13.7)
GFR SERPL CREATININE-BSD FRML MDRD: 100 ML/MIN (ref 90–?)
GLUCOSE SERPL-MCNC: 170 MG/DL (ref 74–106)
GLUCOSE UR STRIP-MCNC: NEGATIVE MG/DL
HCT VFR BLD AUTO: 50.8 % (ref 36–52)
HGB BLD-MCNC: 16.4 G/DL (ref 12–18)
HGB UR QL STRIP: NEGATIVE
LEUKOCYTE ESTERASE UR QL STRIP: NEGATIVE
LIPASE SERPL-CCNC: 148 U/L (ref 73–393)
LYMPHOCYTES # BLD AUTO: 1.5 K/UL (ref 2–11.5)
LYMPHOCYTES NFR BLD AUTO: 14.8 % (ref 20.5–51.1)
MCH RBC QN AUTO: 25 PG (ref 27–31)
MCHC RBC AUTO-ENTMCNC: 32 G/DL (ref 33–37)
MCV RBC AUTO: 76.7 FL (ref 80–94)
MONOCYTES # BLD AUTO: 0.3 K/UL (ref 0.8–1)
MONOCYTES NFR BLD AUTO: 3 % (ref 1.7–9.3)
NEUTROPHILS # BLD AUTO: 8.5 K/UL (ref 1.8–7.7)
NEUTROPHILS NFR BLD AUTO: 81.9 % (ref 42.2–75.2)
NITRITE UR QL STRIP: NEGATIVE
PH UR STRIP: 5 [PH] (ref 5–9)
PLATELET # BLD AUTO: 242 K/UL (ref 140–450)
POTASSIUM SERPL-SCNC: 4.3 MMOL/L (ref 3.5–5.1)
RBC # BLD AUTO: 6.62 MIL/UL (ref 4.2–6.1)
SODIUM SERPL-SCNC: 139 MMOL/L (ref 136–145)
WBC # BLD AUTO: 10.4 K/UL (ref 4.8–10.8)

## 2018-10-27 PROCEDURE — 83690 ASSAY OF LIPASE: CPT

## 2018-10-27 PROCEDURE — 80053 COMPREHEN METABOLIC PANEL: CPT

## 2018-10-27 PROCEDURE — 76705 ECHO EXAM OF ABDOMEN: CPT

## 2018-10-27 PROCEDURE — 96374 THER/PROPH/DIAG INJ IV PUSH: CPT

## 2018-10-27 PROCEDURE — 96361 HYDRATE IV INFUSION ADD-ON: CPT

## 2018-10-27 PROCEDURE — 36415 COLL VENOUS BLD VENIPUNCTURE: CPT

## 2018-10-27 PROCEDURE — 85025 COMPLETE CBC W/AUTO DIFF WBC: CPT

## 2018-10-27 PROCEDURE — 96375 TX/PRO/DX INJ NEW DRUG ADDON: CPT

## 2018-10-27 PROCEDURE — C9113 INJ PANTOPRAZOLE SODIUM, VIA: HCPCS

## 2018-10-27 PROCEDURE — 99285 EMERGENCY DEPT VISIT HI MDM: CPT

## 2018-10-27 PROCEDURE — 81003 URINALYSIS AUTO W/O SCOPE: CPT

## 2018-10-27 NOTE — NUR
PATIENT PRESENTS TO ED WITH THE CHIEF C/O DIZZINESS AND PAIN RIGHT SIDE ABDOMEN 
PAIN. BURNING PAIN. PT STATES PAIN STARTED 1 WEEK AGO. HAS NAUSEA AT THIS TIME. 
DENIES V/D; SKIN IS PINK/WARM/DRY; AAOX4 WITH EVEN AND STEADY GAIT; LUNGS CLEAR 
BL; HR EVEN AND REGULAR; PT DENIES ANY FEVER, CP, SOB, OR COUGH AT THIS TIME; 
PATIENT STATES PAIN OF 8/10 AT THIS TIME; VSS; PATIENT POSITIONED FOR COMFORT; 
HOB ELEVATED; BEDRAILS UP X2; BED DOWN. ER MD MADE AWARE OF PT STATUS.

## 2018-10-27 NOTE — NUR
Patient discharged with v/s stable. Written and verbal after care instructions 
given and explained. Patient alert, oriented and verbalized understanding of 
instructions. Ambulatory with steady gait. All questions addressed prior to 
discharge. ID band removed. Patient advised to follow up with PMD. Rx of 
OMEPRAZOLE AND MECLIZINE given. Patient educated on indication of medication 
including possible reaction and side effects. Opportunity to ask questions 
provided and answered.

## 2019-10-09 ENCOUNTER — HOSPITAL ENCOUNTER (EMERGENCY)
Dept: HOSPITAL 74 - JERFT | Age: 34
Discharge: HOME | End: 2019-10-09
Payer: COMMERCIAL

## 2019-10-09 VITALS — SYSTOLIC BLOOD PRESSURE: 120 MMHG | DIASTOLIC BLOOD PRESSURE: 85 MMHG | TEMPERATURE: 98.3 F | HEART RATE: 82 BPM

## 2019-10-09 VITALS — BODY MASS INDEX: 27.2 KG/M2

## 2019-10-09 DIAGNOSIS — Z98.890: ICD-10-CM

## 2019-10-09 DIAGNOSIS — J02.9: Primary | ICD-10-CM

## 2019-10-09 DIAGNOSIS — K92.9: ICD-10-CM

## 2019-10-09 NOTE — PDOC
History of Present Illness





- General


Chief Complaint: Sore Throat


Stated Complaint: THROAT PAIN AND LT EAR PAIN


Time Seen by Provider: 10/09/19 13:50


History Source: Patient


Exam Limitations: No Limitations





Past History





- Travel


Traveled outside of the country in the last 30 days: No


Close contact w/someone who was outside of country & ill: No





- Past Medical History


Allergies/Adverse Reactions: 


 Allergies











Allergy/AdvReac Type Severity Reaction Status Date / Time


 


No Known Allergies Allergy   Verified 10/09/19 13:48











Home Medications: 


Ambulatory Orders





Ibuprofen 600 mg PO TID #21 tablet 10/08/17 


Pseudoephedrine HCl [Sudafed 12 Hour] 120 mg PO BID #14 tablet.er 10/08/17 








Anemia: No


Asthma: No


Cancer: No


Cardiac Disorders: No


CVA: No


COPD: No


DVT: No


Dementia: No


Diabetes: No


Dialysis: No


GI Disorders: No


 Disorders: No


HTN: No


Hypercholesterolemia: No


Kidney Stones: No


Liver Disease: No


Psychiatric Problems: No


Seizures: No


Thyroid Disease: Yes (Sleep Problems)


Lung CA: No





- Surgical History


Abdominal Surgery: Yes (INGUINAL AND UMBILICAL HERNIA REPAIR)





- Immunization History


Immunization Up to Date: Yes





- Psycho Social/Smoking Cessation Hx


Smoking History: Never smoked


Have you smoked in the past 12 months: No


Number of Cigarettes Smoked Daily: 0


Hx Alcohol Use: No


Drug/Substance Use Hx: No


Substance Use Type: Alcohol





**Review of Systems





- Review of Systems


Able to Perform ROS?: Yes


Comments:: 





10/09/19 15:01


CONSTITUTIONAL: 


Absent: fever, chills, diaphoresis, generalized weakness, malaise, loss of 

appetite


HEENT: 


Present: sore throat Absent: rhinorrhea, nasal congestion, throat swelling, 

difficulty swallowing, mouth swelling, ear pain, eye pain, visual Changes


CARDIOVASCULAR: 


Absent: chest pain, loss of consciousness, palpitations, irregular heart rate, 

peripheral edema


RESPIRATORY: 


Absent: cough, shortness of breath, dyspnea with exertion, orthopnea, wheezing, 

stridor, hemoptysis


GASTROINTESTINAL:


Absent: abdominal pain, abdominal distension, nausea, vomiting, diarrhea, 

constipation, melena, hematochezia


GENITOURINARY: 


Absent: dysuria, frequency, urgency, hesitancy, hematuria, flank pain, genital 

pain


MUSCULOSKELETAL: 


Absent: myalgia, arthralgia, joint swelling


SKIN: 


Absent: rash, itching, pallor


HEMATOLOGIC/IMMUNOLOGIC: 


Absent: easy bleeding, easy bruising, lymphadenopathy, frequent infections


ENDOCRINE:


Absent: unexplained weight gain, unexplained weight loss, heat intolerance, 

cold intolerance


NEUROLOGIC: 


Absent: headache, focal weakness or paresthesias, dizziness, unsteady gait, 

seizure, mental status changes, bladder or bowel incontinence


PSYCHIATRIC: 


Absent: anxiety, depression, suicidal or homicidal ideation, hallucinations.


Is the patient limited English proficient: No





*Physical Exam





- Vital Signs


 Last Vital Signs











Temp Pulse Resp BP Pulse Ox


 


 98.3 F   82   16   120/85   97 


 


 10/09/19 13:48  10/09/19 13:48  10/09/19 13:48  10/09/19 13:48  10/09/19 13:48














- Physical Exam


Comments: 





10/09/19 15:01


GENERAL:


Well developed, well nourished. Awake and alert. No acute distress.


HEENT:


Normocephalic, atraumatic. PERRLA, EOMI. No conjunctival pallor. Sclera are non-

icteric. Moist mucous membranes. Oropharynx is with 3+ tonsils and erythema.  

No exudate noted.  Uvula is midline.


NECK: 


Supple. Full ROM. No JVD. Carotid pulses 2+ and symmetric, without bruits. No 

thyromegaly. No lymphadenopathy.


CARDIOVASCULAR:


Regular rate and rhythm. No murmurs, rubs, or gallops. Distal pulses are 2+ and 

symmetric. 


PULMONARY: 


No evidence of respiratory distress. Lungs clear to auscultation bilaterally. 

No wheezing, rales or rhonchi.


ABDOMINAL:


Soft. Non-tender. Non-distended. No rebound or guarding. No organomegaly. 

Normoactive bowel sounds. 


MUSCULOSKELETAL 


Normal range of motion at all joints. No bony deformities or tenderness. No CVA 

tenderness.


EXTREMITIES: 


No cyanosis. No clubbing. No edema. No calf tenderness.


SKIN: 


Warm and dry. Normal capillary refill. No rashes. No jaundice. 


NEUROLOGICAL: 


Alert, awake, appropriate. Cranial nerves 2-12 intact. No deficits to light 

touch and temperature in face, upper extremities and lower extremities. No 

motor deficits in the in face, upper extremities and lower extremities. 

Normoreflexic in the upper and lower extremities. Normal speech. Toes are down-

going bilaterally. Gait is normal without ataxia.


PSYCHIATRIC: 


Cooperative. Good eye contact. Appropriate mood and affect.





ED Treatment Course





- Medications


Given in the ED: 


ED Medications














Discontinued Medications














Generic Name Dose Route Start Last Admin





  Trade Name Lien  PRN Reason Stop Dose Admin


 


Acetaminophen  650 mg  10/09/19 14:20  10/09/19 14:28





  Tylenol Oral Solution -  PO  10/09/19 14:21  650 mg





  ONCE ONE   Administration





     





     





     





     


 


Dexamethasone  10 mg  10/09/19 14:20  10/09/19 14:28





  Decadron Liquid -  PO  10/09/19 14:21  10 mg





  ONCE ONE   Administration





     





     





     





     














Medical Decision Making





- Medical Decision Making





10/09/19 15:01


The patient is a 34-year-old male no past medical history presents to the ER 

with 3 days of sore throat.  He states that it hurts to swallow.  He also notes 

associated left ear pain.  He believes he had a fever but did not take his 

temperature.  He states he is taken Motrin at home with some relief of his 

symptoms.  Denies difficulty breathing, shortness of breath.  He is able to 

swallow his own secretions.  He states he tried to make an appointment with his 

primary care provider however he was able to get an appointment until a week 

from now.





A/P: Pharyngitis


On exam tonsils are erythematous and edematous, 3+ in size.  Uvula is midline.


Rapid strep was obtained and is negative at this time.


Most likely a viral infection.


Decadron and Tylenol given with relief of symptoms.


Will recommend supportive therapy and to keep his follow-up with his primary 

care doctor.


Discharge home


I discussed the physical exam findings, ancillary test results and final 

diagnoses with the patient. I answered all of the patient's questions. The 

patient was satisfied with the care received and felt comfortable with the 

discharge plan and treatment plan.  The Patient agrees to follow up with the 

primary care physician/specialist within 24-72 hours. Return precautions were 

given.





Discharge





- Discharge Information


Problems reviewed: Yes


Clinical Impression/Diagnosis: 


Pharyngitis


Qualifiers:


 Pharyngitis/tonsillitis etiology: unspecified etiology Qualified Code(s): 

J02.9 - Acute pharyngitis, unspecified





Condition: Stable


Disposition: HOME





- Admission


No





- Follow up/Referral





- Patient Discharge Instructions


Patient Printed Discharge Instructions:  DI for Viral Pharyngitis


Additional Instructions: 


You have a sore throat or pharyngitis.


Rapid strep testing was negative today.


You may take Motrin 600 mg every 6 hours as needed for pain.


Please do warm water gargles and cough drops to help with your pain.


Change your toothbrush when you started feeling better.


Follow-up with your primary care doctor.





Return to the ER for fever, difficulty breathing, difficulty swallowing, or if 

you have any changes in your symptoms.





Tiene dolor de garganta o faringitis.


La prueba rpida de estreptococo fue negativa hoy.


Puede megan Motrin 600 mg cada 6 horas segn sea necesario para el dolor.


Aileen grgaras con agua tibia y pastillas para la tos para aliviar maxwell dolor.


Cambie maxwell cepillo de dientes cuando empiece a sentirse mejor.


Aileen un seguimiento con maxwell mdico de atencin primaria.





Regrese a la german de emergencias por fiebre, dificultad para respirar, 

dificultad para tragar o si tiene algn cambio en ian sntomas.





- Post Discharge Activity


Work/Back to School Note:  Back to Work

## 2020-12-07 ENCOUNTER — HOSPITAL ENCOUNTER (EMERGENCY)
Dept: HOSPITAL 26 - MED | Age: 35
Discharge: HOME | End: 2020-12-07
Payer: SELF-PAY

## 2020-12-07 VITALS — SYSTOLIC BLOOD PRESSURE: 121 MMHG | DIASTOLIC BLOOD PRESSURE: 98 MMHG

## 2020-12-07 VITALS — BODY MASS INDEX: 28 KG/M2 | HEIGHT: 71 IN | WEIGHT: 200 LBS

## 2020-12-07 DIAGNOSIS — J32.9: Primary | ICD-10-CM

## 2020-12-07 NOTE — NUR
C/O NOSE BLEED, HEADACHE 9/10 X 1 WEEK. COVID TESTED 1 WEEK AGO: NEGATIVE 
RESULT.

NO BLEEDING AT THIS TIME.

## 2020-12-07 NOTE — NUR
Patient discharged with v/s stable. Written and verbal after care instructions 
given and explained. 

Patient alert, oriented and verbalized understanding of instructions. 
Ambulatory with steady gait. All questions addressed prior to discharge. ID 
band removed. Patient advised to follow up with PMD. Rx of Augmentin 875mg 
given. Patient educated on indication of medication including possible reaction 
and side effects. Opportunity to ask questions provided and answered.

## 2020-12-07 NOTE — NUR
-------------------------------------------------------------------------------

            *** Note undone in [facility] - 12/07/20 at 1337 by MED1 ***            

-------------------------------------------------------------------------------

[id number]

## 2021-05-20 ENCOUNTER — HOSPITAL ENCOUNTER (EMERGENCY)
Dept: HOSPITAL 74 - JER | Age: 36
Discharge: HOME | End: 2021-05-20
Payer: COMMERCIAL

## 2021-05-20 VITALS — TEMPERATURE: 98.1 F

## 2021-05-20 VITALS — HEART RATE: 78 BPM | DIASTOLIC BLOOD PRESSURE: 82 MMHG | SYSTOLIC BLOOD PRESSURE: 120 MMHG

## 2021-05-20 VITALS — BODY MASS INDEX: 28.6 KG/M2

## 2021-05-20 DIAGNOSIS — R10.2: Primary | ICD-10-CM

## 2021-05-20 LAB
APPEARANCE UR: CLEAR
BILIRUB UR STRIP.AUTO-MCNC: NEGATIVE MG/DL
COLOR UR: YELLOW
KETONES UR QL STRIP: NEGATIVE
LEUKOCYTE ESTERASE UR QL STRIP.AUTO: NEGATIVE
NITRITE UR QL STRIP: NEGATIVE
PH UR: 5.5 [PH] (ref 5–8)
PROT UR QL STRIP: NEGATIVE
PROT UR QL STRIP: NEGATIVE
SP GR UR: 1.02 (ref 1.01–1.03)
UROBILINOGEN UR STRIP-MCNC: 0.2 MG/DL (ref 0.2–1)